# Patient Record
Sex: MALE | Race: WHITE | NOT HISPANIC OR LATINO | Employment: OTHER | URBAN - METROPOLITAN AREA
[De-identification: names, ages, dates, MRNs, and addresses within clinical notes are randomized per-mention and may not be internally consistent; named-entity substitution may affect disease eponyms.]

---

## 2017-01-16 ENCOUNTER — CLINICAL SUPPORT (OUTPATIENT)
Dept: INTERNAL MEDICINE | Facility: CLINIC | Age: 77
End: 2017-01-16
Payer: MEDICARE

## 2017-01-16 DIAGNOSIS — J45.30 MILD PERSISTENT ASTHMA WITHOUT COMPLICATION: Chronic | ICD-10-CM

## 2017-01-16 DIAGNOSIS — J30.2 SEASONAL ALLERGIC RHINITIS DUE TO FUNGAL SPORES: ICD-10-CM

## 2017-01-16 DIAGNOSIS — J30.89 ALLERGIC RHINITIS DUE TO DUST MITE: Chronic | ICD-10-CM

## 2017-01-16 PROCEDURE — 99499 UNLISTED E&M SERVICE: CPT | Mod: S$PBB,,, | Performed by: ALLERGY & IMMUNOLOGY

## 2017-01-16 NOTE — PROGRESS NOTES
Pt. Here for allergy injection. Currently doing well without symptoms or sequela. .  Newly mixed vial. Dose adjusted.  Vaccine #1 Vial #1 0.30ml sq right arm +0 reaction. Pt. Tolerated well.

## 2017-01-23 ENCOUNTER — CLINICAL SUPPORT (OUTPATIENT)
Dept: INTERNAL MEDICINE | Facility: CLINIC | Age: 77
End: 2017-01-23
Payer: MEDICARE

## 2017-01-23 DIAGNOSIS — H10.45 CONJUNCTIVITIS, CHRONIC ALLERGIC: Chronic | ICD-10-CM

## 2017-01-23 DIAGNOSIS — J30.89 ALLERGIC RHINITIS DUE TO DUST MITE: Chronic | ICD-10-CM

## 2017-01-23 DIAGNOSIS — J30.2 SEASONAL ALLERGIC RHINITIS DUE TO FUNGAL SPORES: ICD-10-CM

## 2017-01-23 DIAGNOSIS — J45.30 MILD PERSISTENT ASTHMA WITHOUT COMPLICATION: Chronic | ICD-10-CM

## 2017-01-23 PROCEDURE — 99499 UNLISTED E&M SERVICE: CPT | Mod: S$PBB,,, | Performed by: ALLERGY & IMMUNOLOGY

## 2017-01-23 NOTE — PROGRESS NOTES
Pt. Here for allergy injection. Currently doing well without symptoms or sequela. .  Newly mixed vial. Dose adjusted.  Vaccine #1 Vial #1 0.35ml sq right arm +0 reaction. Pt. Tolerated well.

## 2017-01-30 ENCOUNTER — CLINICAL SUPPORT (OUTPATIENT)
Dept: INTERNAL MEDICINE | Facility: CLINIC | Age: 77
End: 2017-01-30
Payer: MEDICARE

## 2017-01-30 DIAGNOSIS — J45.30 MILD PERSISTENT ASTHMA WITHOUT COMPLICATION: Chronic | ICD-10-CM

## 2017-01-30 DIAGNOSIS — J30.89 ALLERGIC RHINITIS DUE TO DUST MITE: Chronic | ICD-10-CM

## 2017-01-30 DIAGNOSIS — H10.45 CONJUNCTIVITIS, CHRONIC ALLERGIC: Chronic | ICD-10-CM

## 2017-01-30 PROCEDURE — 99499 UNLISTED E&M SERVICE: CPT | Mod: S$PBB,,, | Performed by: ALLERGY & IMMUNOLOGY

## 2017-01-30 NOTE — PROGRESS NOTES
Pt. Here for allergy injection. Currently doing well without symptoms or sequela. .  Newly mixed vial. Dose adjusted.  Vaccine #1 Vial #1 0.40ml sq right arm +1 reaction. Pt. Tolerated well.

## 2017-02-06 ENCOUNTER — CLINICAL SUPPORT (OUTPATIENT)
Dept: INTERNAL MEDICINE | Facility: CLINIC | Age: 77
End: 2017-02-06
Payer: MEDICARE

## 2017-02-06 DIAGNOSIS — J30.89 ALLERGIC RHINITIS DUE TO DUST MITE: Chronic | ICD-10-CM

## 2017-02-06 DIAGNOSIS — J45.30 MILD PERSISTENT ASTHMA WITHOUT COMPLICATION: Chronic | ICD-10-CM

## 2017-02-06 DIAGNOSIS — H10.45 CONJUNCTIVITIS, CHRONIC ALLERGIC: Chronic | ICD-10-CM

## 2017-02-06 PROCEDURE — 99499 UNLISTED E&M SERVICE: CPT | Mod: S$PBB,,, | Performed by: ALLERGY & IMMUNOLOGY

## 2017-02-06 NOTE — PROGRESS NOTES
Pt. Here for allergy injection. Currently doing well without symptoms or sequela. .  Vaccine #1 Vial #1 0.45 ml sq right arm +0 reaction. Pt. Tolerated well.

## 2017-02-13 ENCOUNTER — CLINICAL SUPPORT (OUTPATIENT)
Dept: INTERNAL MEDICINE | Facility: CLINIC | Age: 77
End: 2017-02-13
Payer: MEDICARE

## 2017-02-13 DIAGNOSIS — H10.45 CONJUNCTIVITIS, CHRONIC ALLERGIC: Chronic | ICD-10-CM

## 2017-02-13 DIAGNOSIS — J45.30 MILD PERSISTENT ASTHMA WITHOUT COMPLICATION: Chronic | ICD-10-CM

## 2017-02-13 DIAGNOSIS — J30.89 ALLERGIC RHINITIS DUE TO DUST MITE: Chronic | ICD-10-CM

## 2017-02-13 PROCEDURE — 99499 UNLISTED E&M SERVICE: CPT | Mod: S$PBB,,, | Performed by: ALLERGY & IMMUNOLOGY

## 2017-02-13 NOTE — PROGRESS NOTES
Pt. Here for allergy injection. Currently doing well without symptoms or sequela. .  Vaccine #1 Vial #1 0.50 ml sq right arm +0 reaction. Pt. Tolerated well. Pt. Will increase back to Monthly interval.

## 2017-03-31 ENCOUNTER — CLINICAL SUPPORT (OUTPATIENT)
Dept: INTERNAL MEDICINE | Facility: CLINIC | Age: 77
End: 2017-03-31
Payer: MEDICARE

## 2017-03-31 PROCEDURE — 99499 UNLISTED E&M SERVICE: CPT | Mod: S$PBB,,, | Performed by: ALLERGY & IMMUNOLOGY

## 2017-04-26 ENCOUNTER — OFFICE VISIT (OUTPATIENT)
Dept: ALLERGY | Facility: CLINIC | Age: 77
End: 2017-04-26
Payer: MEDICARE

## 2017-04-26 ENCOUNTER — CLINICAL SUPPORT (OUTPATIENT)
Dept: INTERNAL MEDICINE | Facility: CLINIC | Age: 77
End: 2017-04-26
Payer: MEDICARE

## 2017-04-26 VITALS
WEIGHT: 230.19 LBS | SYSTOLIC BLOOD PRESSURE: 130 MMHG | HEIGHT: 68 IN | DIASTOLIC BLOOD PRESSURE: 72 MMHG | HEART RATE: 76 BPM | BODY MASS INDEX: 34.89 KG/M2 | OXYGEN SATURATION: 96 %

## 2017-04-26 DIAGNOSIS — J45.30 MILD PERSISTENT ASTHMA WITHOUT COMPLICATION: Primary | Chronic | ICD-10-CM

## 2017-04-26 DIAGNOSIS — H10.45 CONJUNCTIVITIS, CHRONIC ALLERGIC: Chronic | ICD-10-CM

## 2017-04-26 DIAGNOSIS — K21.9 GASTROESOPHAGEAL REFLUX DISEASE, ESOPHAGITIS PRESENCE NOT SPECIFIED: Chronic | ICD-10-CM

## 2017-04-26 DIAGNOSIS — J30.89 ALLERGIC RHINITIS DUE TO DUST MITE: Chronic | ICD-10-CM

## 2017-04-26 PROCEDURE — 99499 UNLISTED E&M SERVICE: CPT | Mod: S$PBB,,, | Performed by: FAMILY MEDICINE

## 2017-04-26 PROCEDURE — 99211 OFF/OP EST MAY X REQ PHY/QHP: CPT | Mod: PBBFAC,27,PO

## 2017-04-26 PROCEDURE — 95115 IMMUNOTHERAPY ONE INJECTION: CPT | Mod: PBBFAC,PO

## 2017-04-26 PROCEDURE — 99999 PR PBB SHADOW E&M-EST. PATIENT-LVL IV: CPT | Mod: PBBFAC,,, | Performed by: ALLERGY & IMMUNOLOGY

## 2017-04-26 PROCEDURE — 99999 PR PBB SHADOW E&M-EST. PATIENT-LVL I: CPT | Mod: PBBFAC,,,

## 2017-04-26 RX ORDER — INSULIN DEGLUDEC 200 U/ML
INJECTION, SOLUTION SUBCUTANEOUS
Refills: 5 | COMMUNITY
Start: 2017-03-06

## 2017-04-26 RX ORDER — INSULIN ASPART 100 [IU]/ML
40 INJECTION, SOLUTION INTRAVENOUS; SUBCUTANEOUS
COMMUNITY

## 2017-04-26 NOTE — PROGRESS NOTES
Subjective:       Patient ID: Reji Arora is a 77 y.o. male.    Chief Complaint: Follow-up (injection) and Allergies    HPI Comments: Patient is known to me in private practice and in the [facility] system. He is followed for allergic rhinitis, allergic conjunctivitis, and mild persistent asthma. Symptoms are currently stable on medication regimen and on allergy injections monthly. He would like to continue monthly allergy injections for symptoms management. Currently he does not need medication refilled. He will call office when needed. He has history of GERD exacerbations complicate asthma management. Currently he is asymptomatic and stable on current medication management. Past records reviewed.    New diagnosis of Uterine cancer for his wife. She will under go chemotherapy and radiation. Emotional support provided.    Review of Systems   Constitutional: Negative.  Negative for activity change, appetite change, chills, diaphoresis, fatigue, fever and unexpected weight change.   HENT: Negative.  Negative for congestion, dental problem, drooling, ear discharge, ear pain, facial swelling, hearing loss, mouth sores, nosebleeds, postnasal drip, rhinorrhea, sinus pressure, sneezing, sore throat, tinnitus, trouble swallowing and voice change.    Eyes: Negative.  Negative for photophobia, pain, discharge, redness, itching and visual disturbance.   Respiratory: Negative.  Negative for apnea, cough, choking, chest tightness, shortness of breath, wheezing and stridor.    Cardiovascular: Negative.  Negative for chest pain, palpitations and leg swelling.   Gastrointestinal: Negative.  Negative for abdominal distention, abdominal pain, constipation, diarrhea, nausea and vomiting.   Endocrine: Negative.  Negative for cold intolerance, heat intolerance, polydipsia, polyphagia and polyuria.   Genitourinary: Negative.  Negative for decreased urine volume, difficulty urinating, dysuria, enuresis, frequency and urgency.    Musculoskeletal: Negative.  Negative for arthralgias, back pain, gait problem, joint swelling, myalgias, neck pain and neck stiffness.   Skin: Negative.  Negative for color change, pallor, rash and wound.   Allergic/Immunologic: Negative.  Negative for environmental allergies, food allergies and immunocompromised state.   Neurological: Negative.  Negative for dizziness, tremors, seizures, syncope, facial asymmetry, speech difficulty, weakness, light-headedness, numbness and headaches.   Hematological: Negative.  Negative for adenopathy. Does not bruise/bleed easily.   Psychiatric/Behavioral: Negative.  Negative for agitation, behavioral problems, confusion, decreased concentration, dysphoric mood, hallucinations, self-injury, sleep disturbance and suicidal ideas. The patient is not nervous/anxious and is not hyperactive.      Objective:   Physical Exam   Constitutional: He is oriented to person, place, and time. He appears well-developed and well-nourished. He is active and cooperative.  Non-toxic appearance. He does not have a sickly appearance. He does not appear ill. No distress.   HENT:   Head: Normocephalic and atraumatic. Head is without abrasion, without right periorbital erythema and without left periorbital erythema.   Right Ear: Hearing, tympanic membrane, external ear and ear canal normal. No lacerations. No drainage, swelling or tenderness. No foreign bodies. No mastoid tenderness. Tympanic membrane is not injected, not scarred, not perforated, not erythematous, not retracted and not bulging. Tympanic membrane mobility is normal. No middle ear effusion. No decreased hearing is noted.   Left Ear: Hearing, tympanic membrane, external ear and ear canal normal. No lacerations. No drainage, swelling or tenderness. No foreign bodies. No mastoid tenderness. Tympanic membrane is not injected, not scarred, not perforated, not erythematous, not retracted and not bulging. Tympanic membrane mobility is normal.  No  middle ear effusion. No decreased hearing is noted.   Nose: Nose normal. No mucosal edema, rhinorrhea, nose lacerations, sinus tenderness, nasal deformity, septal deviation or nasal septal hematoma. No epistaxis.  No foreign bodies. Right sinus exhibits no maxillary sinus tenderness and no frontal sinus tenderness. Left sinus exhibits no maxillary sinus tenderness and no frontal sinus tenderness.   Mouth/Throat: Uvula is midline, oropharynx is clear and moist and mucous membranes are normal. He does not have dentures. No oral lesions. No trismus in the jaw. No dental abscesses, uvula swelling, lacerations or dental caries. No oropharyngeal exudate, posterior oropharyngeal edema, posterior oropharyngeal erythema or tonsillar abscesses. No tonsillar exudate.   Eyes: Conjunctivae, EOM and lids are normal. Pupils are equal, round, and reactive to light. Right eye exhibits no chemosis, no discharge and no exudate. Left eye exhibits no chemosis, no discharge and no exudate. Right conjunctiva is not injected. Right conjunctiva has no hemorrhage. Left conjunctiva is not injected. Left conjunctiva has no hemorrhage. No scleral icterus.   Neck: Trachea normal, normal range of motion, full passive range of motion without pain and phonation normal. No tracheal tenderness and no muscular tenderness present. No rigidity. No tracheal deviation, no edema, no erythema and normal range of motion present. No thyroid mass and no thyromegaly present.   Cardiovascular: Normal rate, regular rhythm, normal heart sounds and normal pulses.  Exam reveals no gallop and no friction rub.    No murmur heard.  Pulmonary/Chest: Effort normal and breath sounds normal. No accessory muscle usage or stridor. No apnea, no tachypnea and no bradypnea. He has no decreased breath sounds. He has no wheezes. He has no rhonchi. He has no rales. He exhibits no tenderness.   Abdominal: Soft. Normal appearance and bowel sounds are normal. He exhibits no  distension. There is no tenderness. There is no rigidity, no rebound, no guarding and no CVA tenderness.   Musculoskeletal: Normal range of motion. He exhibits no edema, tenderness or deformity.   Lymphadenopathy:        Head (right side): No submental, no submandibular, no tonsillar, no preauricular, no posterior auricular and no occipital adenopathy present.        Head (left side): No submental, no submandibular, no tonsillar, no preauricular, no posterior auricular and no occipital adenopathy present.     He has no cervical adenopathy.        Right cervical: No superficial cervical, no deep cervical and no posterior cervical adenopathy present.       Left cervical: No superficial cervical, no deep cervical and no posterior cervical adenopathy present.        Right: No supraclavicular and no epitrochlear adenopathy present.        Left: No supraclavicular and no epitrochlear adenopathy present.   Neurological: He is alert and oriented to person, place, and time. He has normal strength. He is not disoriented. No cranial nerve deficit. He exhibits normal muscle tone. Coordination and gait normal.   Skin: Skin is warm and dry. No abrasion, no bruising, no laceration, no lesion, no petechiae, no purpura and no rash noted. Rash is not macular, not papular, not maculopapular, not nodular, not pustular, not vesicular and not urticarial. He is not diaphoretic. No cyanosis or erythema. No pallor. Nails show no clubbing.   Psychiatric: He has a normal mood and affect. His speech is normal and behavior is normal. Judgment and thought content normal. His mood appears not anxious. His affect is not inappropriate. Cognition and memory are normal. He does not express impulsivity or inappropriate judgment. He expresses no homicidal and no suicidal ideation. He is attentive.   Nursing note and vitals reviewed.    Assessment:     1. Mild persistent asthma without complication    2. Allergic rhinitis due to dust mite    3.  Gastroesophageal reflux disease, esophagitis presence not specified    4. Conjunctivitis, chronic allergic      Plan:   Reji was seen today for follow-up and allergies.    Diagnoses and all orders for this visit:    Mild persistent asthma without complication    Allergic rhinitis due to dust mite    Gastroesophageal reflux disease, esophagitis presence not specified    Conjunctivitis, chronic allergic      Return in about 1 year (around 4/26/2018) for as long as well, sooner if symptomatic.    Discussed options and strategies  Reviewed medications risk v. Benefit  Reviewed pathophysiology  All questions answered  Emotional support provided  Pt verbalized understanding of all the above and treatment plan.      PACO Cho

## 2017-04-26 NOTE — PROGRESS NOTES
Patient in for allergy injection. Stated no new meds, no problems with last injection.    0.35  ml of 1:1  given SQ in  Right  arm. Tolerated well.     Patient assessed after 30 minutes . No reaction noted.     Patient voice no complaints . Patient in XIS. Computer states injection given in the Left arm. That is incorrect . Right arm injected

## 2017-04-26 NOTE — PATIENT INSTRUCTIONS
Continue monthly allergy injections  Continue current medication regimen  Call if problems or symptoms occur

## 2017-04-26 NOTE — PROGRESS NOTES
Subjective:       Patient ID: Reji Arora is a 77 y.o. male.    Chief Complaint: Follow-up (injection) and Allergies    HPI  Review of Systems  Objective:   Physical Exam  Assessment:     1. Mild persistent asthma without complication    2. Allergic rhinitis due to dust mite    3. Gastroesophageal reflux disease, esophagitis presence not specified    4. Conjunctivitis, chronic allergic      Plan:   Reji was seen today for follow-up and allergies.    Diagnoses and all orders for this visit:    Mild persistent asthma without complication    Allergic rhinitis due to dust mite    Gastroesophageal reflux disease, esophagitis presence not specified    Conjunctivitis, chronic allergic    Return in about 1 year (around 4/26/2018) for as long as well, sooner if symptomatic.    Discussed options and strategies  Reviewed medications risk v. Benefit  Reviewed pathophysiology  All questions answered  Emotional support provided  Pt verbalized understanding of all the above and treatment plan.      PACO Cho

## 2017-06-02 ENCOUNTER — CLINICAL SUPPORT (OUTPATIENT)
Dept: INTERNAL MEDICINE | Facility: CLINIC | Age: 77
End: 2017-06-02
Payer: MEDICARE

## 2017-06-02 DIAGNOSIS — J45.30 MILD PERSISTENT ASTHMA WITHOUT COMPLICATION: Chronic | ICD-10-CM

## 2017-06-02 DIAGNOSIS — H10.45 CONJUNCTIVITIS, CHRONIC ALLERGIC: Chronic | ICD-10-CM

## 2017-06-02 DIAGNOSIS — J30.89 ALLERGIC RHINITIS DUE TO DUST MITE: Chronic | ICD-10-CM

## 2017-06-02 PROCEDURE — 95115 IMMUNOTHERAPY ONE INJECTION: CPT | Mod: PBBFAC,PO

## 2017-06-02 PROCEDURE — 99499 UNLISTED E&M SERVICE: CPT | Mod: S$PBB,,, | Performed by: ALLERGY & IMMUNOLOGY

## 2017-06-05 ENCOUNTER — CLINICAL SUPPORT (OUTPATIENT)
Dept: INTERNAL MEDICINE | Facility: CLINIC | Age: 77
End: 2017-06-05
Payer: MEDICARE

## 2017-06-05 DIAGNOSIS — J30.89 ALLERGIC RHINITIS DUE TO DUST MITE: Chronic | ICD-10-CM

## 2017-06-05 PROCEDURE — 95115 IMMUNOTHERAPY ONE INJECTION: CPT | Mod: PBBFAC,PO

## 2017-06-05 PROCEDURE — 99499 UNLISTED E&M SERVICE: CPT | Mod: S$PBB,,, | Performed by: FAMILY MEDICINE

## 2017-06-05 NOTE — PROGRESS NOTES
Patient in for allergy injection. Stated no new meds, no problems with last injection.    0.40  ml of 1:1  given SQ in Left Upper  arm. Tolerated well.     Patient assessed after 30 minutes . No reaction noted.     Patient voice no complaints

## 2017-06-12 ENCOUNTER — CLINICAL SUPPORT (OUTPATIENT)
Dept: INTERNAL MEDICINE | Facility: CLINIC | Age: 77
End: 2017-06-12
Payer: MEDICARE

## 2017-06-12 DIAGNOSIS — J30.89 ALLERGIC RHINITIS DUE TO DUST MITE: Chronic | ICD-10-CM

## 2017-06-12 PROCEDURE — 99499 UNLISTED E&M SERVICE: CPT | Mod: S$PBB,,, | Performed by: FAMILY MEDICINE

## 2017-06-12 PROCEDURE — 95115 IMMUNOTHERAPY ONE INJECTION: CPT | Mod: PBBFAC,PO

## 2017-06-12 NOTE — PROGRESS NOTES
Patient in for allergy injection. Stated no new meds, no problems with last injection.    0.45  ml of 1:1   given SQ in Right Upper  arm. Tolerated well.     Patient assessed after 30 minutes . No reaction noted.     Patient voice no complaints

## 2017-06-19 ENCOUNTER — CLINICAL SUPPORT (OUTPATIENT)
Dept: INTERNAL MEDICINE | Facility: CLINIC | Age: 77
End: 2017-06-19
Payer: MEDICARE

## 2017-06-19 DIAGNOSIS — J30.89 ALLERGIC RHINITIS DUE TO DUST MITE: Chronic | ICD-10-CM

## 2017-06-19 PROCEDURE — 95115 IMMUNOTHERAPY ONE INJECTION: CPT | Mod: PBBFAC,PO

## 2017-06-19 PROCEDURE — 99499 UNLISTED E&M SERVICE: CPT | Mod: S$PBB,,, | Performed by: FAMILY MEDICINE

## 2017-06-26 ENCOUNTER — CLINICAL SUPPORT (OUTPATIENT)
Dept: INTERNAL MEDICINE | Facility: CLINIC | Age: 77
End: 2017-06-26
Payer: MEDICARE

## 2017-06-26 DIAGNOSIS — J30.89 ALLERGIC RHINITIS DUE TO DUST MITE: Chronic | ICD-10-CM

## 2017-06-26 DIAGNOSIS — J45.30 MILD PERSISTENT ASTHMA WITHOUT COMPLICATION: Chronic | ICD-10-CM

## 2017-06-26 PROCEDURE — 95115 IMMUNOTHERAPY ONE INJECTION: CPT | Mod: PBBFAC,PO

## 2017-06-26 PROCEDURE — 99499 UNLISTED E&M SERVICE: CPT | Mod: S$PBB,,, | Performed by: FAMILY MEDICINE

## 2017-06-26 NOTE — PROGRESS NOTES
Patient in for allergy injection. Stated no new meds, no problems with last injection.    0.50  ml of 1:1   given SQ in Right Upper  arm. Tolerated well.     Patient assessed after 30 minutes . No reaction noted.     Patient voice no complaints

## 2017-07-05 NOTE — PROGRESS NOTES
Documenting for Dina Rolon LPN for visit scheduled for 3/31/17.  Valentina Shields LPN documenting from what was ascertained by looking into patient's chart and visits.    Patient was scheduled for allergy injection on 3/31/17 but did not receive an injection.  There is no notation in XIS of injection, nor in EPIC, paper documentation could not be found; not in binder in clinic.

## 2017-07-06 NOTE — PROGRESS NOTES
Valentina Shields, MEAGANN documenting 7/6/17  for Dina Rolon LPN for allergy injection given 6/2/17 at [facility].    0.35mL of 1:1 Red given.  0-2+ reaction noted in Xis

## 2017-07-10 ENCOUNTER — CLINICAL SUPPORT (OUTPATIENT)
Dept: INTERNAL MEDICINE | Facility: CLINIC | Age: 77
End: 2017-07-10
Payer: MEDICARE

## 2017-07-10 DIAGNOSIS — J30.89 ALLERGIC RHINITIS DUE TO DUST MITE: Chronic | ICD-10-CM

## 2017-07-10 DIAGNOSIS — H10.45 CONJUNCTIVITIS, CHRONIC ALLERGIC: Chronic | ICD-10-CM

## 2017-07-10 PROCEDURE — 95115 IMMUNOTHERAPY ONE INJECTION: CPT | Mod: PBBFAC,PO

## 2017-07-10 PROCEDURE — 99499 UNLISTED E&M SERVICE: CPT | Mod: S$PBB,,, | Performed by: ALLERGY & IMMUNOLOGY

## 2017-07-10 NOTE — PROGRESS NOTES
Patient here for allergy injection.  No new meds, no problems after last injection.    0.5mL given of 1:1 Red, SQ in upper right arm.  Tolerated well, no bleeding noted.    Site assessed after 30 minutes.  1+ reaction noted.

## 2017-08-11 ENCOUNTER — CLINICAL SUPPORT (OUTPATIENT)
Dept: INTERNAL MEDICINE | Facility: CLINIC | Age: 77
End: 2017-08-11
Payer: MEDICARE

## 2017-08-11 DIAGNOSIS — J45.30 MILD PERSISTENT ASTHMA WITHOUT COMPLICATION: Chronic | ICD-10-CM

## 2017-08-11 DIAGNOSIS — J30.89 ALLERGIC RHINITIS DUE TO DUST MITE: Chronic | ICD-10-CM

## 2017-08-11 DIAGNOSIS — H10.45 CONJUNCTIVITIS, CHRONIC ALLERGIC: Chronic | ICD-10-CM

## 2017-08-11 PROCEDURE — 95115 IMMUNOTHERAPY ONE INJECTION: CPT | Mod: PBBFAC,PO

## 2017-08-11 PROCEDURE — 99499 UNLISTED E&M SERVICE: CPT | Mod: S$PBB,,, | Performed by: ALLERGY & IMMUNOLOGY

## 2017-09-11 ENCOUNTER — CLINICAL SUPPORT (OUTPATIENT)
Dept: INTERNAL MEDICINE | Facility: CLINIC | Age: 77
End: 2017-09-11
Payer: MEDICARE

## 2017-09-11 DIAGNOSIS — H10.45 CONJUNCTIVITIS, CHRONIC ALLERGIC: Chronic | ICD-10-CM

## 2017-09-11 DIAGNOSIS — J45.30 MILD PERSISTENT ASTHMA WITHOUT COMPLICATION: Chronic | ICD-10-CM

## 2017-09-11 DIAGNOSIS — J30.89 ALLERGIC RHINITIS DUE TO DUST MITE: Chronic | ICD-10-CM

## 2017-09-11 PROCEDURE — 99499 UNLISTED E&M SERVICE: CPT | Mod: S$PBB,,, | Performed by: ALLERGY & IMMUNOLOGY

## 2017-09-11 PROCEDURE — 95115 IMMUNOTHERAPY ONE INJECTION: CPT | Mod: PBBFAC,PO

## 2017-09-11 NOTE — PROGRESS NOTES
Patient here for allergy injection.  No new meds, no problems after last injection.    0.5mL given of 1:1 Red, SQ in upper right arm.  Tolerated well, no bleeding noted.    Site assessed after 30 minutes. 0 reaction noted.

## 2017-10-16 ENCOUNTER — CLINICAL SUPPORT (OUTPATIENT)
Dept: INTERNAL MEDICINE | Facility: CLINIC | Age: 77
End: 2017-10-16
Payer: MEDICARE

## 2017-10-16 DIAGNOSIS — J30.89 ALLERGIC RHINITIS DUE TO DUST MITE: Chronic | ICD-10-CM

## 2017-10-16 PROCEDURE — 99499 UNLISTED E&M SERVICE: CPT | Mod: S$PBB,,, | Performed by: ALLERGY & IMMUNOLOGY

## 2017-10-16 PROCEDURE — 95115 IMMUNOTHERAPY ONE INJECTION: CPT | Mod: PBBFAC,PO

## 2017-10-16 NOTE — PROGRESS NOTES
Patient here for allergy injection.  No new meds, no problems after last injection.    0.5mL given of 1:1 Red, SQ in upper right arm.  Tolerated well, no bleeding noted.  Patient in observation area for 30 minutes.  No reaction noted to injection site.

## 2017-11-15 ENCOUNTER — CLINICAL SUPPORT (OUTPATIENT)
Dept: INTERNAL MEDICINE | Facility: CLINIC | Age: 77
End: 2017-11-15
Payer: MEDICARE

## 2017-11-15 DIAGNOSIS — J30.89 ALLERGIC RHINITIS DUE TO DUST MITE: Chronic | ICD-10-CM

## 2017-11-15 DIAGNOSIS — H10.45 CONJUNCTIVITIS, CHRONIC ALLERGIC: Chronic | ICD-10-CM

## 2017-11-15 PROCEDURE — 99499 UNLISTED E&M SERVICE: CPT | Mod: S$PBB,,, | Performed by: ALLERGY & IMMUNOLOGY

## 2017-11-15 PROCEDURE — 95115 IMMUNOTHERAPY ONE INJECTION: CPT | Mod: PBBFAC,PO

## 2018-01-04 ENCOUNTER — OFFICE VISIT (OUTPATIENT)
Dept: ALLERGY | Facility: CLINIC | Age: 78
End: 2018-01-04
Payer: MEDICARE

## 2018-01-04 VITALS
SYSTOLIC BLOOD PRESSURE: 110 MMHG | DIASTOLIC BLOOD PRESSURE: 50 MMHG | HEART RATE: 96 BPM | WEIGHT: 245.56 LBS | BODY MASS INDEX: 36.37 KG/M2 | HEIGHT: 69 IN

## 2018-01-04 DIAGNOSIS — H10.45 CONJUNCTIVITIS, CHRONIC ALLERGIC: Chronic | ICD-10-CM

## 2018-01-04 DIAGNOSIS — J30.2 CHRONIC SEASONAL ALLERGIC RHINITIS DUE TO FUNGAL SPORES: Chronic | ICD-10-CM

## 2018-01-04 DIAGNOSIS — J45.30 MILD PERSISTENT ASTHMA WITHOUT COMPLICATION: Primary | Chronic | ICD-10-CM

## 2018-01-04 DIAGNOSIS — Z51.6 ALLERGY DESENSITIZATION THERAPY: Chronic | ICD-10-CM

## 2018-01-04 DIAGNOSIS — K21.9 GASTROESOPHAGEAL REFLUX DISEASE, ESOPHAGITIS PRESENCE NOT SPECIFIED: Chronic | ICD-10-CM

## 2018-01-04 DIAGNOSIS — J30.89 ALLERGIC RHINITIS DUE TO DUST MITE: Chronic | ICD-10-CM

## 2018-01-04 PROCEDURE — 99213 OFFICE O/P EST LOW 20 MIN: CPT | Mod: PBBFAC,PO | Performed by: ALLERGY & IMMUNOLOGY

## 2018-01-04 PROCEDURE — 95165 ANTIGEN THERAPY SERVICES: CPT | Mod: S$PBB,,, | Performed by: ALLERGY & IMMUNOLOGY

## 2018-01-04 PROCEDURE — 99213 OFFICE O/P EST LOW 20 MIN: CPT | Mod: 25,S$PBB,, | Performed by: ALLERGY & IMMUNOLOGY

## 2018-01-04 PROCEDURE — 95165 ANTIGEN THERAPY SERVICES: CPT | Mod: PBBFAC,PO | Performed by: ALLERGY & IMMUNOLOGY

## 2018-01-04 PROCEDURE — 99999 PR PBB SHADOW E&M-EST. PATIENT-LVL III: CPT | Mod: PBBFAC,,, | Performed by: ALLERGY & IMMUNOLOGY

## 2018-01-05 NOTE — PROGRESS NOTES
Referring physician: No ref. provider found    Primary Care Physician: Jorge Perez MD    Chief Complaint: Other (here for yearly visit, needs remix of vaccine, no new concerns)    Patient is known to me. The last visit was 4/26/2017 by [facility]  Patient is accompanied: No  Diagnosis at previous visit:  1. Mild persistent asthma without complication    2. Allergic rhinitis due to dust mite    3. Gastroesophageal reflux disease, esophagitis presence not specified    4. Conjunctivitis, chronic allergic        Subjective:         HPI    Reji Arora is a 77 y.o. male here for a follow-up visit related to ongoing allergen immunotherapy for ALLERGIC rhinitis, ALLERGIC conjunctivitis and ALLERGIC asthma.  He is here because he would like to continue allergen specific immunotherapy.  He states that each time he has stopped his immunotherapy he has had a relapse; consequently he chooses not to stop his injections at this time but wishes to continue.  He states that he is not having any nasal symptoms and not needing nasal steroids.  He is not having any ocular symptoms or needing any medications for this.  He is currently on Breo 100/25 ellipta one puff inhaled every 24 hours.  His ATC score today is 24.    Review of Systems  Constitutional: Negative for changes in appetite, unintentional weight loss, fever, chills and fatigue.   HENT: Negative for facial pain, nose bleeds, nasal congestion, postnasal drip, throat clearing, sinus pressure, and voice change. Negative for ear discharge, ear pain, facial swelling, sore throat and trouble swallowing.  Eyes: Negative for occular discharge, redness, itching and visual disturbance.  Respiratory: Negative for chest tightness, shortness of breath, wheezing, dyspnea on exertion, sputum production and cough.   Cardiovascular: Negative for chest pain, palpatations and leg swelling.  Gastrointestinal: Negative for abdominal distension, abdominal pain, constipation,  diarrhea, nausea,and vomiting.   Genitourinary: Negative for difficulty urinating.   Musculoskeletal: Negative for arthralgias, gait problems, joint swelling, myalgias and back pain.   Neurological: Negative for dizziness, syncope, weakness, light-headedness, and headaches.   Hematological: Negative for adenopathy, does not bruise or bleed easily.  Psychiatric/Behavioral: Negative for agitation, anxiety, behavioral problems, confusion, and insomnia.  Skin: Negative for rash.     PMH:  Reviewed with the patient and current for this visit    Family History:  Reviewed with the patient and current for this visit    Social History:  Reviewed with the patient and current for this visit     Environmental History:  Reviewed with the patient and current for this visit          Objective:      Skin Test results: Patient has positive prick skin test house dust mites and multiple mold spores (Alternaria, aspergillus, Drechslera, Epicoccum, Fusarium, and penicillium)     Immunotherapy: Patient has been on immunotherapy since 9/14/2010.  Patient is currently on maintenance dose at a monthly interval.  His last injection was 12/15/2017 vial #1 red top 1:1 at 0.50 mL.    Physical Exam  General:patient is well developed and well nourished, in no acute distress.  Patient is obese.  Patient uses a cane for ambulation  Mental Status:  Alert, oriented and cooperative  Head and Face: normocephalic   Allergic shiners: No  Eyes:   Pupils: ERRLA: Scleral conjunctiva: clear; Cornea: clear; Palprebal conjunctiva: normal: Eyelid Skin: normal  Ears:Tympanic membrane Left:intact and normal light reflex; Right:intact and normal light reflex; External canals normal bilaterally.  Nose:  Nares: patent; Mucosa :pink and moist; Nasal septum: midline;Turbinates are of normal size bilaterally and do not occlude the nasal airway.  Mouth/Pharynx: tonsils present; posterior pharyngeal wall normal; tongue normal; teeth normal; voice quality normal.  Neck:   Cervical lymph nodes: small, non-tender, freely moveable both anterior and posterior cervical chain; Trachea: midline; Masses: none  Lungs: Air movement is good; respiratory effort is good; no respiratory distress; breath sounds are vesicular in all lung fields; no wheezing; normal expiratory time; no cough.  Heart: regular rate and rhythm with mild respiratory variation; A1 and P2 are normal; no murmurs or gallops.  Abdomen:exam not done  Extremities: no cyanosis, clubbing or edema  Skin:no rashes or lesions present; skin hydrated and supple.            Assessment:       1. Mild persistent asthma without complication: Stable on medications and immunotherapy     2. Allergic rhinitis due to dust mite: Quiescent     3. Chronic seasonal allergic rhinitis due to fungal spores: Quiescent     4. Conjunctivitis, chronic allergic: Quiescent     5. Gastroesophageal reflux disease, esophagitis presence not specified     6. Allergy desensitization therapy: With significant benefit             Plan:         Return for re-visit: Return in about 1 year (around 1/4/2019).    Immunotherapy: Yes; patient wishes to continue immunotherapy long-term because he has had relapses with each previous discontinuation in the past.    Lab or X-ray: No    Outside medical records requested: No        Patient Instructions   1.  Patient is to continue Breo ellipta 100/25 one puff inhaled every 24 hours.  2.  Patient is to continue allergen specific immunotherapy.  He understands that once the vaccines are remixed that the initial dose is reduced to 0.35 mL and that the next week he should come back for an injection of his full maintenance dose of vial #1 red top 1:1 at 0.50 cc.  3.  Patient understands that I will retire in June 2018.  He understands that Dr. Camargo is available in the [facility] for his revisit in one year.          Bronwyn Childers MD

## 2018-01-05 NOTE — PATIENT INSTRUCTIONS
1.  Patient is to continue Breo ellipta 100/25 one puff inhaled every 24 hours.  2.  Patient is to continue allergen specific immunotherapy.  He understands that once the vaccines are remixed that the initial dose is reduced to 0.35 mL and that the next week he should come back for an injection of his full maintenance dose of vial #1 red top 1:1 at 0.50 cc.  3.  Patient understands that I will retire in June 2018.  He understands that Dr. Camargo is available in the [place] clinic for his revisit in one year.

## 2018-01-22 ENCOUNTER — TELEPHONE (OUTPATIENT)
Dept: ALLERGY | Facility: CLINIC | Age: 78
End: 2018-01-22

## 2018-01-22 NOTE — TELEPHONE ENCOUNTER
Patient's new vaccine is in.  Called, no answer, left msg on voicemail to call [facility] to schedule an injection appt.

## 2018-01-23 NOTE — TELEPHONE ENCOUNTER
----- Message from Christal Avendaño sent at 1/22/2018  3:01 PM CST -----  Contact: [phone]  Called asking if his allergy meds is ready.

## 2018-01-29 ENCOUNTER — CLINICAL SUPPORT (OUTPATIENT)
Dept: INTERNAL MEDICINE | Facility: CLINIC | Age: 78
End: 2018-01-29
Payer: MEDICARE

## 2018-01-29 ENCOUNTER — TELEPHONE (OUTPATIENT)
Dept: ALLERGY | Facility: CLINIC | Age: 78
End: 2018-01-29

## 2018-01-29 DIAGNOSIS — H10.45 CONJUNCTIVITIS, CHRONIC ALLERGIC: Chronic | ICD-10-CM

## 2018-01-29 DIAGNOSIS — J30.89 ALLERGIC RHINITIS DUE TO DUST MITE: Chronic | ICD-10-CM

## 2018-01-29 DIAGNOSIS — J45.30 MILD PERSISTENT ASTHMA WITHOUT COMPLICATION: Chronic | ICD-10-CM

## 2018-01-29 PROCEDURE — 95115 IMMUNOTHERAPY ONE INJECTION: CPT | Mod: PBBFAC,PO

## 2018-01-29 PROCEDURE — 99499 UNLISTED E&M SERVICE: CPT | Mod: S$PBB,,, | Performed by: ALLERGY & IMMUNOLOGY

## 2018-01-29 NOTE — PROGRESS NOTES
Patient here for allergy injection.  No new meds, no problems after last injection.  Dosage backed up due to new vial.    0.35mL given of 1:1 Red, SQ in upper right arm.  Tolerated well, no bleeding noted.  Patient in observation area for 30 minutes.  1+ area of induration noted.  No complaints voiced.

## 2018-01-29 NOTE — TELEPHONE ENCOUNTER
----- Message from Valentina Shields LPN sent at 1/16/2018  3:00 PM CST -----  Dr. Childers,  As of today, 1/16/18, it's been 62 days since Dr. 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  His vaccines just arrived at the [facility].  You have his next dose as 0.35mL of red 1:1.  Is that dose still OK since it's a new vaccine and the length of time has increased, too?  Thanks,  Valentina

## 2018-01-29 NOTE — TELEPHONE ENCOUNTER
----- Message from Valentina Shields LPN sent at 1/29/2018  8:34 AM CST -----  Hi Dr. Childers,    Dr. 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  The treatment plan for him starts him at 0.35 and moves up 0.05 for 4 weeks.  Can he go from 0.35mL to 0.5mL of his new maintenance vial?  Thanks,  Valentina

## 2018-02-05 ENCOUNTER — CLINICAL SUPPORT (OUTPATIENT)
Dept: INTERNAL MEDICINE | Facility: CLINIC | Age: 78
End: 2018-02-05
Payer: MEDICARE

## 2018-02-05 DIAGNOSIS — H10.45 CONJUNCTIVITIS, CHRONIC ALLERGIC: Chronic | ICD-10-CM

## 2018-02-05 DIAGNOSIS — J30.89 ALLERGIC RHINITIS DUE TO DUST MITE: Chronic | ICD-10-CM

## 2018-02-05 PROCEDURE — 99499 UNLISTED E&M SERVICE: CPT | Mod: S$PBB,,, | Performed by: ALLERGY & IMMUNOLOGY

## 2018-02-05 PROCEDURE — 95115 IMMUNOTHERAPY ONE INJECTION: CPT | Mod: PBBFAC,PO

## 2018-02-05 NOTE — PROGRESS NOTES
Patient here for allergy injection.  No new meds, no problems after last injection.  Dosage backed up due to new vial.    05mL given of 1:1 Red, SQ in upper right arm.  Tolerated well, no bleeding noted.  Patient in observation area for 30 minutes.  0 reaction noted.   No complaints voiced.

## 2018-04-06 ENCOUNTER — CLINICAL SUPPORT (OUTPATIENT)
Dept: INTERNAL MEDICINE | Facility: CLINIC | Age: 78
End: 2018-04-06
Payer: MEDICARE

## 2018-04-06 DIAGNOSIS — J30.89 ALLERGIC RHINITIS DUE TO DUST MITE: Chronic | ICD-10-CM

## 2018-04-06 DIAGNOSIS — H10.45 CONJUNCTIVITIS, CHRONIC ALLERGIC: Chronic | ICD-10-CM

## 2018-04-06 PROCEDURE — 99499 UNLISTED E&M SERVICE: CPT | Mod: S$PBB,,, | Performed by: ALLERGY & IMMUNOLOGY

## 2018-04-06 PROCEDURE — 95115 IMMUNOTHERAPY ONE INJECTION: CPT | Mod: PBBFAC,PO

## 2018-04-06 NOTE — PROGRESS NOTES
Patient here for allergy injection.  No new meds, no problems after last injection.  Dosage backed up due to length of time since last injection, 60 days.  0.25mL given of 1:1 Red, SQ in upper right arm.  Tolerated well, no bleeding noted.  Patient in observation area for 30 minutes.  1+ induration reaction noted.   No complaints voiced.    Next injections, scheduled for 2 weeks will receive 0.5mL.

## 2018-04-27 ENCOUNTER — CLINICAL SUPPORT (OUTPATIENT)
Dept: INTERNAL MEDICINE | Facility: CLINIC | Age: 78
End: 2018-04-27
Payer: MEDICARE

## 2018-04-27 DIAGNOSIS — J30.89 ALLERGIC RHINITIS DUE TO DUST MITE: Chronic | ICD-10-CM

## 2018-04-27 DIAGNOSIS — H10.45 CONJUNCTIVITIS, CHRONIC ALLERGIC: Chronic | ICD-10-CM

## 2018-04-27 PROCEDURE — 95115 IMMUNOTHERAPY ONE INJECTION: CPT | Mod: PBBFAC,PO

## 2018-04-27 PROCEDURE — 99499 UNLISTED E&M SERVICE: CPT | Mod: S$PBB,,, | Performed by: ALLERGY & IMMUNOLOGY

## 2018-04-27 NOTE — PROGRESS NOTES
Patient here for allergy injection.  No new meds, no problems after last injection.  Dosage repeated due to length of time since last injection.  0.25mL given of 1:1 Red, SQ in upper right arm.  Tolerated well, no bleeding noted.  Patient in observation area for 30 minutes.  0 induration reaction noted.   No complaints voiced.    Next injections, scheduled for 2 weeks will receive 0.5mL.

## 2018-05-04 ENCOUNTER — CLINICAL SUPPORT (OUTPATIENT)
Dept: INTERNAL MEDICINE | Facility: CLINIC | Age: 78
End: 2018-05-04
Payer: MEDICARE

## 2018-05-04 DIAGNOSIS — J30.89 ALLERGIC RHINITIS DUE TO DUST MITE: Chronic | ICD-10-CM

## 2018-05-04 PROCEDURE — 95115 IMMUNOTHERAPY ONE INJECTION: CPT | Mod: PBBFAC,PO

## 2018-05-04 PROCEDURE — 99499 UNLISTED E&M SERVICE: CPT | Mod: S$PBB,,, | Performed by: ALLERGY & IMMUNOLOGY

## 2018-05-04 NOTE — PROGRESS NOTES
Patient here for allergy injection.  No new meds, no problems after last injection.   0.3mL given of 1:1 Red, SQ in upper right arm.  Tolerated well, no bleeding noted.  Patient in observation area for 30 minutes.  1+ induration reaction noted.   No complaints voiced.

## 2018-05-11 ENCOUNTER — CLINICAL SUPPORT (OUTPATIENT)
Dept: INTERNAL MEDICINE | Facility: CLINIC | Age: 78
End: 2018-05-11
Payer: MEDICARE

## 2018-05-11 DIAGNOSIS — J30.89 ALLERGIC RHINITIS DUE TO DUST MITE: Chronic | ICD-10-CM

## 2018-05-11 PROCEDURE — 95115 IMMUNOTHERAPY ONE INJECTION: CPT | Mod: PBBFAC,PO

## 2018-05-11 PROCEDURE — 99499 UNLISTED E&M SERVICE: CPT | Mod: S$PBB,,, | Performed by: ALLERGY & IMMUNOLOGY

## 2018-05-11 NOTE — PROGRESS NOTES
Patient here for allergy injection.  No new meds, no problems after last injection.   0.35mL given of 1:1 Red, SQ in upper right arm.  Tolerated well, no bleeding noted.  Patient in observation area for 30 minutes. 2+ induration reaction noted.   No complaints voiced.

## 2018-05-21 ENCOUNTER — CLINICAL SUPPORT (OUTPATIENT)
Dept: INTERNAL MEDICINE | Facility: CLINIC | Age: 78
End: 2018-05-21
Payer: MEDICARE

## 2018-05-21 DIAGNOSIS — J30.89 ALLERGIC RHINITIS DUE TO DUST MITE: Chronic | ICD-10-CM

## 2018-05-21 PROCEDURE — 95115 IMMUNOTHERAPY ONE INJECTION: CPT | Mod: PBBFAC,PO

## 2018-05-21 PROCEDURE — 99499 UNLISTED E&M SERVICE: CPT | Mod: S$PBB,,, | Performed by: ALLERGY & IMMUNOLOGY

## 2018-05-21 NOTE — PROGRESS NOTES
Patient here for allergy injection.  No new meds, no problems after last injection.   0.4mL given of 1:1 Red, SQ in upper right arm.  Tolerated well, no bleeding noted.  Patient in observation area for 30 minutes. No reaction noted.   No complaints voiced.
